# Patient Record
Sex: FEMALE | Race: WHITE | URBAN - METROPOLITAN AREA
[De-identification: names, ages, dates, MRNs, and addresses within clinical notes are randomized per-mention and may not be internally consistent; named-entity substitution may affect disease eponyms.]

---

## 2024-05-09 ENCOUNTER — OFFICE VISIT (OUTPATIENT)
Dept: PRIMARY CARE | Facility: CLINIC | Age: 64
End: 2024-05-09
Payer: COMMERCIAL

## 2024-05-09 VITALS
DIASTOLIC BLOOD PRESSURE: 95 MMHG | BODY MASS INDEX: 27.76 KG/M2 | RESPIRATION RATE: 18 BRPM | HEIGHT: 65 IN | HEART RATE: 99 BPM | SYSTOLIC BLOOD PRESSURE: 135 MMHG | OXYGEN SATURATION: 98 % | WEIGHT: 166.6 LBS

## 2024-05-09 DIAGNOSIS — I10 PRIMARY HYPERTENSION: Primary | ICD-10-CM

## 2024-05-09 DIAGNOSIS — E78.49 FAMILIAL HYPERLIPIDEMIA: ICD-10-CM

## 2024-05-09 DIAGNOSIS — F17.210 NICOTINE DEPENDENCE, CIGARETTES, UNCOMPLICATED: ICD-10-CM

## 2024-05-09 DIAGNOSIS — Z00.00 ROUTINE GENERAL MEDICAL EXAMINATION AT A HEALTH CARE FACILITY: ICD-10-CM

## 2024-05-09 DIAGNOSIS — Z13.6 SCREENING FOR CARDIOVASCULAR CONDITION: ICD-10-CM

## 2024-05-09 DIAGNOSIS — I73.9 CLAUDICATION (CMS-HCC): ICD-10-CM

## 2024-05-09 PROCEDURE — 99204 OFFICE O/P NEW MOD 45 MIN: CPT | Performed by: FAMILY MEDICINE

## 2024-05-09 PROCEDURE — 4004F PT TOBACCO SCREEN RCVD TLK: CPT | Performed by: FAMILY MEDICINE

## 2024-05-09 PROCEDURE — 3075F SYST BP GE 130 - 139MM HG: CPT | Performed by: FAMILY MEDICINE

## 2024-05-09 PROCEDURE — 3080F DIAST BP >= 90 MM HG: CPT | Performed by: FAMILY MEDICINE

## 2024-05-09 RX ORDER — ENALAPRIL MALEATE 5 MG/1
5 TABLET ORAL DAILY
COMMUNITY
End: 2024-05-22 | Stop reason: SDUPTHER

## 2024-05-09 NOTE — PROGRESS NOTES
"Subjective   Patient ID: Gertrudis Marie is a 64 y.o. female who presents for New Patient Visit.      Patient is here to establish care  Needs forms signed by provider for work  Blood pressure is controlled but has a blood pressure difference in both upper extremities 120/84 on the right side 135/90 on the left side    Continues to smoke has been trying to quit  Last cholesterol was high has not been rechecked in 2 years except 3 work  Current Outpatient Medications on File Prior to Visit   Medication Sig Dispense Refill    enalapril (Vasotec) 5 mg tablet Take 1 tablet (5 mg) by mouth once daily.      MULTIVITAMIN ORAL Take by mouth.       No current facility-administered medications on file prior to visit.        Review of Systems   Constitutional:  Negative for chills and fever.   HENT: Negative.     Respiratory: Negative.     Cardiovascular: Negative.    Gastrointestinal: Negative.  Negative for nausea and vomiting.   Endocrine: Negative.    Genitourinary: Negative.    Musculoskeletal: Negative.    Skin: Negative.  Negative for rash.   Allergic/Immunologic: Negative.    Neurological: Negative.    Hematological: Negative.    Psychiatric/Behavioral: Negative.     All other systems reviewed and are negative.      Objective   BP (!) 135/95 (BP Location: Left arm, Patient Position: Sitting, BP Cuff Size: Adult)   Pulse 99   Resp 18   Ht 1.651 m (5' 5\")   Wt 75.6 kg (166 lb 9.6 oz)   SpO2 98%   BMI 27.72 kg/m²   BSA: 1.86 meters squared  Growth percentiles: Facility age limit for growth %ezekiel is 20 years. Facility age limit for growth %ezekiel is 20 years.   No visits with results within 1 Week(s) from this visit.   Latest known visit with results is:   No results found for any previous visit.      Physical Exam  Constitutional:       General: She is not in acute distress.  Eyes:      Extraocular Movements: Extraocular movements intact.   Cardiovascular:      Rate and Rhythm: Normal rate and regular rhythm. "   Pulmonary:      Breath sounds: Normal breath sounds.   Abdominal:      General: Bowel sounds are normal.   Musculoskeletal:         General: Normal range of motion.      Cervical back: No rigidity.   Skin:     Findings: No rash.   Neurological:      General: No focal deficit present.      Mental Status: She is alert.   Psychiatric:         Thought Content: Thought content normal.         Assessment/Plan   Problem List Items Addressed This Visit             ICD-10-CM    Primary hypertension - Primary I10     Continue Vasotec monitor blood pressure at home  Discussed modifiable ( diet , exercise, weight ) and non modifiable ( Age, family history) risk factors HTN .  Encouraged to stay physically active , healthy eating habits - limit red meat intake, more of plant based diet, freshly prepared foods etc.,    Adverse effects of untreated hypertension discussed which include CAD/MI, CVA ,  CKD etc.,           Relevant Orders    Lipid Panel    CBC    TSH with reflex to Free T4 if abnormal    Hemoglobin A1C    Comprehensive Metabolic Panel    CT cardiac scoring wo IV contrast    Familial hyperlipidemia E78.49     Will check lipid panel patient was advised to follow low-fat diet and avoid red meat cheese increase exercise         Relevant Orders    CT cardiac scoring wo IV contrast    Nicotine dependence, cigarettes, uncomplicated F17.210    Relevant Orders    CT lung screening low dose    Screening for cardiovascular condition Z13.6    Relevant Orders    CT cardiac scoring wo IV contrast    Claudication (CMS-HCC) I73.9     Patient does not have claudication but blood pressure is different in both arms will check PVRs         Relevant Orders    Vascular US upper PVR

## 2024-05-11 PROBLEM — F17.210 NICOTINE DEPENDENCE, CIGARETTES, UNCOMPLICATED: Status: ACTIVE | Noted: 2024-05-11

## 2024-05-11 PROBLEM — Z13.6 SCREENING FOR CARDIOVASCULAR CONDITION: Status: ACTIVE | Noted: 2024-05-11

## 2024-05-11 PROBLEM — Z00.00 ROUTINE GENERAL MEDICAL EXAMINATION AT A HEALTH CARE FACILITY: Status: ACTIVE | Noted: 2024-05-11

## 2024-05-11 PROBLEM — E78.49 FAMILIAL HYPERLIPIDEMIA: Status: ACTIVE | Noted: 2024-05-11

## 2024-05-11 PROBLEM — I73.9 CLAUDICATION (CMS-HCC): Status: ACTIVE | Noted: 2024-05-11

## 2024-05-11 PROBLEM — I10 PRIMARY HYPERTENSION: Status: ACTIVE | Noted: 2024-05-11

## 2024-05-11 ASSESSMENT — ENCOUNTER SYMPTOMS
FEVER: 0
CARDIOVASCULAR NEGATIVE: 1
VOMITING: 0
CHILLS: 0
NAUSEA: 0
RESPIRATORY NEGATIVE: 1
ALLERGIC/IMMUNOLOGIC NEGATIVE: 1
GASTROINTESTINAL NEGATIVE: 1
PSYCHIATRIC NEGATIVE: 1
HEMATOLOGIC/LYMPHATIC NEGATIVE: 1
MUSCULOSKELETAL NEGATIVE: 1
NEUROLOGICAL NEGATIVE: 1
ENDOCRINE NEGATIVE: 1

## 2024-05-11 NOTE — ASSESSMENT & PLAN NOTE
Continue Vasotec monitor blood pressure at home  Discussed modifiable ( diet , exercise, weight ) and non modifiable ( Age, family history) risk factors HTN .  Encouraged to stay physically active , healthy eating habits - limit red meat intake, more of plant based diet, freshly prepared foods etc.,    Adverse effects of untreated hypertension discussed which include CAD/MI, CVA ,  CKD etc.,

## 2024-05-11 NOTE — ASSESSMENT & PLAN NOTE
Will check lipid panel patient was advised to follow low-fat diet and avoid red meat cheese increase exercise

## 2024-05-16 ENCOUNTER — ANCILLARY PROCEDURE (OUTPATIENT)
Dept: CARDIOLOGY | Facility: CLINIC | Age: 64
End: 2024-05-16
Payer: COMMERCIAL

## 2024-05-16 DIAGNOSIS — I73.9 CLAUDICATION (CMS-HCC): ICD-10-CM

## 2024-05-16 PROCEDURE — 93922 UPR/L XTREMITY ART 2 LEVELS: CPT | Performed by: INTERNAL MEDICINE

## 2024-05-16 PROCEDURE — 93922 UPR/L XTREMITY ART 2 LEVELS: CPT

## 2024-05-16 PROCEDURE — 93930 UPPER EXTREMITY STUDY: CPT | Performed by: INTERNAL MEDICINE

## 2024-05-22 DIAGNOSIS — I10 PRIMARY HYPERTENSION: ICD-10-CM

## 2024-05-22 RX ORDER — ENALAPRIL MALEATE 5 MG/1
5 TABLET ORAL DAILY
Qty: 90 TABLET | Refills: 1 | Status: SHIPPED | OUTPATIENT
Start: 2024-05-22

## 2024-05-24 ENCOUNTER — HOSPITAL ENCOUNTER (OUTPATIENT)
Dept: RADIOLOGY | Facility: CLINIC | Age: 64
Discharge: HOME | End: 2024-05-24
Payer: COMMERCIAL

## 2024-05-24 ENCOUNTER — LAB (OUTPATIENT)
Dept: LAB | Facility: LAB | Age: 64
End: 2024-05-24
Payer: COMMERCIAL

## 2024-05-24 DIAGNOSIS — Z00.00 ROUTINE GENERAL MEDICAL EXAMINATION AT A HEALTH CARE FACILITY: ICD-10-CM

## 2024-05-24 DIAGNOSIS — I10 PRIMARY HYPERTENSION: ICD-10-CM

## 2024-05-24 DIAGNOSIS — F17.210 NICOTINE DEPENDENCE, CIGARETTES, UNCOMPLICATED: ICD-10-CM

## 2024-05-24 LAB
ALBUMIN SERPL BCP-MCNC: 4.3 G/DL (ref 3.4–5)
ALP SERPL-CCNC: 82 U/L (ref 33–136)
ALT SERPL W P-5'-P-CCNC: 14 U/L (ref 7–45)
ANION GAP SERPL CALC-SCNC: 14 MMOL/L (ref 10–20)
AST SERPL W P-5'-P-CCNC: 13 U/L (ref 9–39)
BILIRUB SERPL-MCNC: 0.4 MG/DL (ref 0–1.2)
BUN SERPL-MCNC: 11 MG/DL (ref 6–23)
CALCIUM SERPL-MCNC: 9.8 MG/DL (ref 8.6–10.6)
CHLORIDE SERPL-SCNC: 104 MMOL/L (ref 98–107)
CHOLEST SERPL-MCNC: 235 MG/DL (ref 0–199)
CHOLESTEROL/HDL RATIO: 5.1
CO2 SERPL-SCNC: 29 MMOL/L (ref 21–32)
CREAT SERPL-MCNC: 0.83 MG/DL (ref 0.5–1.05)
EGFRCR SERPLBLD CKD-EPI 2021: 79 ML/MIN/1.73M*2
ERYTHROCYTE [DISTWIDTH] IN BLOOD BY AUTOMATED COUNT: 12.8 % (ref 11.5–14.5)
EST. AVERAGE GLUCOSE BLD GHB EST-MCNC: 105 MG/DL
GLUCOSE SERPL-MCNC: 84 MG/DL (ref 74–99)
HBA1C MFR BLD: 5.3 %
HCT VFR BLD AUTO: 41.8 % (ref 36–46)
HDLC SERPL-MCNC: 45.8 MG/DL
HGB BLD-MCNC: 13.8 G/DL (ref 12–16)
LDLC SERPL CALC-MCNC: 163 MG/DL
MCH RBC QN AUTO: 29.5 PG (ref 26–34)
MCHC RBC AUTO-ENTMCNC: 33 G/DL (ref 32–36)
MCV RBC AUTO: 89 FL (ref 80–100)
NON HDL CHOLESTEROL: 189 MG/DL (ref 0–149)
NRBC BLD-RTO: 0 /100 WBCS (ref 0–0)
PLATELET # BLD AUTO: 332 X10*3/UL (ref 150–450)
POTASSIUM SERPL-SCNC: 4.6 MMOL/L (ref 3.5–5.3)
PROT SERPL-MCNC: 6.9 G/DL (ref 6.4–8.2)
RBC # BLD AUTO: 4.68 X10*6/UL (ref 4–5.2)
SODIUM SERPL-SCNC: 142 MMOL/L (ref 136–145)
TRIGL SERPL-MCNC: 132 MG/DL (ref 0–149)
TSH SERPL-ACNC: 0.7 MIU/L (ref 0.44–3.98)
VLDL: 26 MG/DL (ref 0–40)
WBC # BLD AUTO: 7.5 X10*3/UL (ref 4.4–11.3)

## 2024-05-24 PROCEDURE — 71271 CT THORAX LUNG CANCER SCR C-: CPT

## 2024-05-24 PROCEDURE — 80053 COMPREHEN METABOLIC PANEL: CPT

## 2024-05-24 PROCEDURE — 84443 ASSAY THYROID STIM HORMONE: CPT

## 2024-05-24 PROCEDURE — 85027 COMPLETE CBC AUTOMATED: CPT

## 2024-05-24 PROCEDURE — 36415 COLL VENOUS BLD VENIPUNCTURE: CPT

## 2024-05-24 PROCEDURE — 83036 HEMOGLOBIN GLYCOSYLATED A1C: CPT

## 2024-05-24 PROCEDURE — 80061 LIPID PANEL: CPT

## 2024-05-27 DIAGNOSIS — E78.49 FAMILIAL HYPERLIPIDEMIA: Primary | ICD-10-CM

## 2024-05-27 RX ORDER — ATORVASTATIN CALCIUM 10 MG/1
10 TABLET, FILM COATED ORAL DAILY
Qty: 100 TABLET | Refills: 3 | Status: SHIPPED | OUTPATIENT
Start: 2024-05-27 | End: 2025-07-01

## 2024-08-12 ENCOUNTER — HOSPITAL ENCOUNTER (OUTPATIENT)
Dept: RADIOLOGY | Facility: CLINIC | Age: 64
Discharge: HOME | End: 2024-08-12
Payer: COMMERCIAL

## 2024-08-12 DIAGNOSIS — I10 PRIMARY HYPERTENSION: ICD-10-CM

## 2024-08-12 DIAGNOSIS — E78.49 FAMILIAL HYPERLIPIDEMIA: ICD-10-CM

## 2024-08-12 DIAGNOSIS — Z13.6 SCREENING FOR CARDIOVASCULAR CONDITION: ICD-10-CM

## 2024-08-12 PROCEDURE — 75571 CT HRT W/O DYE W/CA TEST: CPT

## 2024-08-29 ENCOUNTER — APPOINTMENT (OUTPATIENT)
Dept: PRIMARY CARE | Facility: CLINIC | Age: 64
End: 2024-08-29
Payer: COMMERCIAL

## 2024-08-29 DIAGNOSIS — E78.49 FAMILIAL HYPERLIPIDEMIA: ICD-10-CM

## 2024-08-29 DIAGNOSIS — Z00.00 ANNUAL PHYSICAL EXAM: Primary | ICD-10-CM

## 2024-08-29 DIAGNOSIS — I10 PRIMARY HYPERTENSION: ICD-10-CM

## 2024-08-29 PROCEDURE — 3079F DIAST BP 80-89 MM HG: CPT | Performed by: FAMILY MEDICINE

## 2024-08-29 PROCEDURE — 3075F SYST BP GE 130 - 139MM HG: CPT | Performed by: FAMILY MEDICINE

## 2024-08-29 PROCEDURE — 99396 PREV VISIT EST AGE 40-64: CPT | Performed by: FAMILY MEDICINE

## 2024-08-29 PROCEDURE — 3008F BODY MASS INDEX DOCD: CPT | Performed by: FAMILY MEDICINE

## 2024-08-29 RX ORDER — ENALAPRIL MALEATE 5 MG/1
5 TABLET ORAL DAILY
Qty: 90 TABLET | Refills: 1 | Status: SHIPPED | OUTPATIENT
Start: 2024-08-29

## 2024-08-29 ASSESSMENT — PATIENT HEALTH QUESTIONNAIRE - PHQ9
2. FEELING DOWN, DEPRESSED OR HOPELESS: NOT AT ALL
4. FEELING TIRED OR HAVING LITTLE ENERGY: NOT AT ALL
1. LITTLE INTEREST OR PLEASURE IN DOING THINGS: NOT AT ALL
3. TROUBLE FALLING OR STAYING ASLEEP OR SLEEPING TOO MUCH: NOT AT ALL
9. THOUGHTS THAT YOU WOULD BE BETTER OFF DEAD, OR OF HURTING YOURSELF: NOT AT ALL
10. IF YOU CHECKED OFF ANY PROBLEMS, HOW DIFFICULT HAVE THESE PROBLEMS MADE IT FOR YOU TO DO YOUR WORK, TAKE CARE OF THINGS AT HOME, OR GET ALONG WITH OTHER PEOPLE: NOT DIFFICULT AT ALL
6. FEELING BAD ABOUT YOURSELF - OR THAT YOU ARE A FAILURE OR HAVE LET YOURSELF OR YOUR FAMILY DOWN: NOT AT ALL
SUM OF ALL RESPONSES TO PHQ QUESTIONS 1-9: 0
5. POOR APPETITE OR OVEREATING: NOT AT ALL
8. MOVING OR SPEAKING SO SLOWLY THAT OTHER PEOPLE COULD HAVE NOTICED. OR THE OPPOSITE, BEING SO FIGETY OR RESTLESS THAT YOU HAVE BEEN MOVING AROUND A LOT MORE THAN USUAL: NOT AT ALL
SUM OF ALL RESPONSES TO PHQ9 QUESTIONS 1 AND 2: 0
7. TROUBLE CONCENTRATING ON THINGS, SUCH AS READING THE NEWSPAPER OR WATCHING TELEVISION: NOT AT ALL

## 2024-08-29 NOTE — PROGRESS NOTES
"  Subjective     Patient ID: Gertrudis Marie is a 64 y.o. female who presents for Annual Exam (CPE).      Last Physical : 1 Years ago     Pt's PMH, PSH, SH, FH , meds and allergies was obtained / reviewed and updated .     Dental visits : Y  Vision issues : N  Hearing issues : N    Immunizations : Y    Diet :  could be better  Exercise: not  Weight concerns : yes    Alcohol: as noted in SH  Tobacco: as noted in SH  Recreational drug use : None/ as noted in SH    Sexually active : Active   Contraception :   Menstrual problems:  Premenopausal/perimenopausal/ postmenopausal:post    G:  Parity:  Full term:    Premature:   (s):   Living :  Ab induced:   Ab spontaneous :  Ectopic :   Multiple :    PAP smear : gyn  Mammogram :utd  Colonoscopy:    Metabolic screening   - Lipid   - Glucose  ======================================================    Visit Vitals  /84   Pulse 109   Resp 15   Ht 1.651 m (5' 5\")   Wt 75.8 kg (167 lb 3.2 oz)   SpO2 95%   BMI 27.82 kg/m²   OB Status Postmenopausal   Smoking Status Some Days   BSA 1.86 m²      No LMP recorded. Patient is postmenopausal.     =====================================    Review of systems:  Constitutional: no chills, no fever and no night sweats.     Eyes: no blurred vision and no eyesight problems.     ENT: no hearing loss, no nasal congestion, no nasal discharge, no hoarseness and no sore throat.     Cardiovascular: no chest pain, no intermittent leg claudication, no lower extremity edema, no palpitations and no syncope.     Respiratory: no cough, no shortness of breath during exertion, no shortness of breath at rest and no wheezing.     Gastrointestinal: no abdominal pain, no blood in stools, no constipation, no diarrhea, no melena, no nausea, no rectal pain and no vomiting.     Genitourinary: no dysuria, no change in urinary frequency, no urinary hesitancy, no feelings of urinary urgency and no vaginal discharge.     Musculoskeletal: no arthralgias, no " back pain and no myalgias.     Integumentary: no new skin lesions and no rashes.     Neurological: no difficulty walking, no headache, no limb weakness, no numbness and no tingling.     Psychiatric: no anxiety, no depression, no anhedonia and no substance use disorders.     Endocrine: no recent weight gain and no recent weight loss.     Hematologic/Lymphatic: no tendency for easy bruising and no swollen glands.   ============================================================    Physical exam :    Constitutional: Alert and in no acute distress. Well developed, well nourished.     Eyes: Normal external exam. Pupils were equal in size, round, reactive to light (PERRL) with normal accommodation and extraocular movements intact (EOMI).     Ears, Nose, Mouth, and Throat: External inspection of ears and nose: Normal.  Otoscopic examination: Normal.      Neck: No neck mass was observed. Supple.     Cardiovascular: Heart rate and rhythm were normal, normal S1 and S2, no gallops, no murmurs and no pericardial rub    Pulmonary: No respiratory distress. Clear bilateral breath sounds.     Abdomen: Soft nontender; no abdominal mass palpated. No organomegaly.     Musculoskeletal: No joint swelling seen, normal movements of all extremities. Range of motion: Normal.  Muscle strength/tone: Normal.      Skin: Normal skin color and pigmentation, normal skin turgor, and no rash.     Neurologic: Deep tendon reflexes were 2+ and symmetric. Sensation: Normal.     Psychiatric: Judgment and insight: Intact. Mood and affect: Normal.    Lymphatic : Cervical/ axillary/ groin Lns Palpable/ non palpable            All other systems have been reviewed and are negative for complaint.      Assessment/Plan    Problem List Items Addressed This Visit             ICD-10-CM    Primary hypertension I10    Relevant Medications    enalapril (Vasotec) 5 mg tablet    Familial hyperlipidemia E78.49    Relevant Orders    Lipid Panel    Comprehensive Metabolic  Panel    Annual physical exam - Primary Z00.00    Relevant Orders    Comprehensive Metabolic Panel

## 2024-09-07 VITALS
WEIGHT: 167.2 LBS | BODY MASS INDEX: 27.86 KG/M2 | HEART RATE: 109 BPM | HEIGHT: 65 IN | OXYGEN SATURATION: 95 % | SYSTOLIC BLOOD PRESSURE: 134 MMHG | RESPIRATION RATE: 15 BRPM | DIASTOLIC BLOOD PRESSURE: 84 MMHG

## 2024-09-07 PROBLEM — Z00.00 ANNUAL PHYSICAL EXAM: Status: ACTIVE | Noted: 2024-09-07

## 2024-10-24 ENCOUNTER — OFFICE VISIT (OUTPATIENT)
Dept: PRIMARY CARE | Facility: CLINIC | Age: 64
End: 2024-10-24
Payer: COMMERCIAL

## 2024-10-24 VITALS — WEIGHT: 167 LBS | HEIGHT: 65 IN | BODY MASS INDEX: 27.82 KG/M2

## 2024-10-24 DIAGNOSIS — J01.00 ACUTE NON-RECURRENT MAXILLARY SINUSITIS: Primary | ICD-10-CM

## 2024-10-24 PROCEDURE — 3008F BODY MASS INDEX DOCD: CPT | Performed by: FAMILY MEDICINE

## 2024-10-24 PROCEDURE — 99213 OFFICE O/P EST LOW 20 MIN: CPT | Performed by: FAMILY MEDICINE

## 2024-10-24 RX ORDER — AZITHROMYCIN 250 MG/1
TABLET, FILM COATED ORAL
Qty: 6 TABLET | Refills: 0 | Status: SHIPPED | OUTPATIENT
Start: 2024-10-24 | End: 2024-10-29

## 2024-10-24 ASSESSMENT — ENCOUNTER SYMPTOMS
FEVER: 0
RHINORRHEA: 1
WEIGHT LOSS: 0
CHILLS: 0
DEPRESSION: 0
SWEATS: 0
MYALGIAS: 0
HEADACHES: 0
SORE THROAT: 0
OCCASIONAL FEELINGS OF UNSTEADINESS: 0
WHEEZING: 0
COUGH: 1
HEARTBURN: 0
SHORTNESS OF BREATH: 0
LOSS OF SENSATION IN FEET: 0
HEMOPTYSIS: 0

## 2024-10-24 ASSESSMENT — PATIENT HEALTH QUESTIONNAIRE - PHQ9
1. LITTLE INTEREST OR PLEASURE IN DOING THINGS: NOT AT ALL
SUM OF ALL RESPONSES TO PHQ9 QUESTIONS 1 AND 2: 0
2. FEELING DOWN, DEPRESSED OR HOPELESS: NOT AT ALL

## 2024-10-24 NOTE — PROGRESS NOTES
Answers submitted by the patient for this visit:  Cough Questionnaire (Submitted on 10/24/2024)  Chief Complaint: Cough  Chronicity: new  Onset: 1 to 4 weeks ago  Progression since onset: gradually improving  Frequency: hourly  Cough characteristics: productive of sputum  chest pain: No  chills: No  ear congestion: No  ear pain: No  fever: No  headaches: No  heartburn: No  hemoptysis: No  myalgias: No  nasal congestion: Yes  postnasal drip: Yes  rash: No  rhinorrhea: Yes  shortness of breath: No  sore throat: No  sweats: No  weight loss: No  wheezing: No  Aggravated by: nothing  Subjective   Patient ID: Gertrudis Marie is a 64 y.o. female who presents for Cough, sneezing, and Postcoital Mucus Check.      Cough  This is a new problem. The current episode started 1 to 4 weeks ago. The problem has been gradually improving. The problem occurs hourly. The cough is Productive of sputum. Associated symptoms include nasal congestion, postnasal drip and rhinorrhea. Pertinent negatives include no chest pain, chills, ear congestion, ear pain, fever, headaches, heartburn, hemoptysis, myalgias, rash, sore throat, shortness of breath, sweats, weight loss or wheezing. Nothing aggravates the symptoms. She has tried OTC cough suppressant for the symptoms. The treatment provided no relief.     Current Outpatient Medications on File Prior to Visit   Medication Sig Dispense Refill    atorvastatin (Lipitor) 10 mg tablet Take 1 tablet (10 mg) by mouth once daily. 100 tablet 3    enalapril (Vasotec) 5 mg tablet Take 1 tablet (5 mg) by mouth once daily. 90 tablet 1    MULTIVITAMIN ORAL Take by mouth.       No current facility-administered medications on file prior to visit.        Review of Systems   Constitutional:  Negative for chills, fever and weight loss.   HENT:  Positive for postnasal drip and rhinorrhea. Negative for ear pain and sore throat.    Respiratory:  Positive for cough. Negative for hemoptysis, shortness of breath and  "wheezing.    Cardiovascular:  Negative for chest pain.   Gastrointestinal:  Negative for heartburn.   Musculoskeletal:  Negative for myalgias.   Skin:  Negative for rash.   Neurological:  Negative for headaches.       Objective   Ht 1.651 m (5' 5\")   Wt 75.8 kg (167 lb)   BMI 27.79 kg/m²   BSA: 1.86 meters squared  Growth percentiles: Facility age limit for growth %ezekiel is 20 years. Facility age limit for growth %ezekiel is 20 years.   No visits with results within 1 Week(s) from this visit.   Latest known visit with results is:   Lab on 05/24/2024   Component Date Value Ref Range Status    Cholesterol 05/24/2024 235 (H)  0 - 199 mg/dL Final          Age      Desirable   Borderline High   High     0-19 Y     0 - 169       170 - 199     >/= 200    20-24 Y     0 - 189       190 - 224     >/= 225         >24 Y     0 - 199       200 - 239     >/= 240   **All ranges are based on fasting samples. Specific   therapeutic targets will vary based on patient-specific   cardiac risk.    Pediatric guidelines reference:Pediatrics 2011, 128(S5).Adult guidelines reference: NCEP ATPIII Guidelines,NANO 2001, 258:2486-97    Venipuncture immediately after or during the administration of Metamizole may lead to falsely low results. Testing should be performed immediately prior to Metamizole dosing.    HDL-Cholesterol 05/24/2024 45.8  mg/dL Final      Age       Very Low   Low     Normal    High    0-19 Y    < 35      < 40     40-45     ----  20-24 Y    ----     < 40      >45      ----        >24 Y      ----     < 40     40-60      >60      Cholesterol/HDL Ratio 05/24/2024 5.1   Final      Ref Values  Desirable  < 3.4  High Risk  > 5.0    LDL Calculated 05/24/2024 163 (H)  <=99 mg/dL Final                                Near   Borderline      AGE      Desirable  Optimal    High     High     Very High     0-19 Y     0 - 109     ---    110-129   >/= 130     ----    20-24 Y     0 - 119     ---    120-159   >/= 160     ----      >24 Y     0 -  " 99   100-129  130-159   160-189     >/=190      VLDL 05/24/2024 26  0 - 40 mg/dL Final    Triglycerides 05/24/2024 132  0 - 149 mg/dL Final       Age         Desirable   Borderline High   High     Very High   0 D-90 D    19 - 174         ----         ----        ----  91 D- 9 Y     0 -  74        75 -  99     >/= 100      ----    10-19 Y     0 -  89        90 - 129     >/= 130      ----    20-24 Y     0 - 114       115 - 149     >/= 150      ----         >24 Y     0 - 149       150 - 199    200- 499    >/= 500    Venipuncture immediately after or during the administration of Metamizole may lead to falsely low results. Testing should be performed immediately prior to Metamizole dosing.    Non HDL Cholesterol 05/24/2024 189 (H)  0 - 149 mg/dL Final          Age       Desirable   Borderline High   High     Very High     0-19 Y     0 - 119       120 - 144     >/= 145    >/= 160    20-24 Y     0 - 149       150 - 189     >/= 190      ----         >24 Y    30 mg/dL above LDL Cholesterol goal      WBC 05/24/2024 7.5  4.4 - 11.3 x10*3/uL Final    nRBC 05/24/2024 0.0  0.0 - 0.0 /100 WBCs Final    RBC 05/24/2024 4.68  4.00 - 5.20 x10*6/uL Final    Hemoglobin 05/24/2024 13.8  12.0 - 16.0 g/dL Final    Hematocrit 05/24/2024 41.8  36.0 - 46.0 % Final    MCV 05/24/2024 89  80 - 100 fL Final    MCH 05/24/2024 29.5  26.0 - 34.0 pg Final    MCHC 05/24/2024 33.0  32.0 - 36.0 g/dL Final    RDW 05/24/2024 12.8  11.5 - 14.5 % Final    Platelets 05/24/2024 332  150 - 450 x10*3/uL Final    Thyroid Stimulating Hormone 05/24/2024 0.70  0.44 - 3.98 mIU/L Final    Hemoglobin A1C 05/24/2024 5.3  see below % Final    Estimated Average Glucose 05/24/2024 105  Not Established mg/dL Final    Glucose 05/24/2024 84  74 - 99 mg/dL Final    Sodium 05/24/2024 142  136 - 145 mmol/L Final    Potassium 05/24/2024 4.6  3.5 - 5.3 mmol/L Final    Chloride 05/24/2024 104  98 - 107 mmol/L Final    Bicarbonate 05/24/2024 29  21 - 32 mmol/L Final    Anion Gap  05/24/2024 14  10 - 20 mmol/L Final    Urea Nitrogen 05/24/2024 11  6 - 23 mg/dL Final    Creatinine 05/24/2024 0.83  0.50 - 1.05 mg/dL Final    eGFR 05/24/2024 79  >60 mL/min/1.73m*2 Final    Calculations of estimated GFR are performed using the 2021 CKD-EPI Study Refit equation without the race variable for the IDMS-Traceable creatinine methods.  https://jasn.asnjournals.org/content/early/2021/09/22/ASN.8887636478    Calcium 05/24/2024 9.8  8.6 - 10.6 mg/dL Final    Albumin 05/24/2024 4.3  3.4 - 5.0 g/dL Final    Alkaline Phosphatase 05/24/2024 82  33 - 136 U/L Final    Total Protein 05/24/2024 6.9  6.4 - 8.2 g/dL Final    AST 05/24/2024 13  9 - 39 U/L Final    Bilirubin, Total 05/24/2024 0.4  0.0 - 1.2 mg/dL Final    ALT 05/24/2024 14  7 - 45 U/L Final    Patients treated with Sulfasalazine may generate falsely decreased results for ALT.      Physical Exam  Constitutional:       Appearance: Normal appearance. She is ill-appearing.   HENT:      Nose: Congestion and rhinorrhea present.   Cardiovascular:      Rate and Rhythm: Normal rate and regular rhythm.   Pulmonary:      Effort: Pulmonary effort is normal.      Breath sounds: Normal breath sounds.   Abdominal:      General: Bowel sounds are normal.   Neurological:      General: No focal deficit present.      Mental Status: She is alert.   Psychiatric:         Mood and Affect: Mood normal.         Assessment/Plan   Problem List Items Addressed This Visit             ICD-10-CM    Acute non-recurrent maxillary sinusitis - Primary J01.00     You have a sinus infection.  Take the antibiotic you were prescribed until it is gone.  Take Mucinex DM or Robitussin DM as needed for cough and congestion.  You can also try Flonase Allergy 2 sprays to each nostril once a day for congestion.  You can also try salt water nose spray for stuffy nose.  If you do not have high blood pressure or heart problems, you can try Sudafed or similar decongestants for congestion.  You can  take Claritin or Zyrtec as needed for drainage.  Take over-the-counter acetaminophen or ibuprofen as needed for pain or fever.  You can try throat lozenges or salt water gargles for any sore throat.  Your symptoms may take 7-10 days to go away.  Drink lots of fluids. Get plenty of rest.  Contact us if you have worsening symptoms such as high fever (102F), shortness of breath, inability to keep liquids and solids down, or other worsening symptoms. Contact us if your symptoms fail to improve after 10 days.  If your symptoms get worse after the clinic is closed, you can call our office to reach the on-call doctor.          Relevant Medications    azithromycin (Zithromax) 250 mg tablet             Comprehensive Medical/Surgical/Social/Family History  No past medical history on file.  No past surgical history on file.  Social History     Social History Narrative    Not on file       Allergies and Medications  Doxycycline, Amoxicillin-pot clavulanate, and Sulfamethoxazole  Current Outpatient Medications   Medication Instructions    atorvastatin (LIPITOR) 10 mg, oral, Daily    azithromycin (Zithromax) 250 mg tablet Take 2 tablets (500 mg) by mouth once daily for 1 day, THEN 1 tablet (250 mg) once daily for 4 days. Take 2 tabs (500 mg) by mouth today, than 1 daily for 4 days..    enalapril (VASOTEC) 5 mg, oral, Daily    MULTIVITAMIN ORAL oral

## 2024-10-27 PROBLEM — J01.00 ACUTE NON-RECURRENT MAXILLARY SINUSITIS: Status: ACTIVE | Noted: 2024-10-27

## 2024-10-27 ASSESSMENT — ENCOUNTER SYMPTOMS
SWEATS: 0
MYALGIAS: 0
COUGH: 1
WEIGHT LOSS: 0
SHORTNESS OF BREATH: 0
HEADACHES: 0
WHEEZING: 0
HEARTBURN: 0
SORE THROAT: 0
CHILLS: 0
RHINORRHEA: 1
HEMOPTYSIS: 0
FEVER: 0

## 2024-11-06 DIAGNOSIS — J01.00 ACUTE NON-RECURRENT MAXILLARY SINUSITIS: Primary | ICD-10-CM

## 2024-11-06 RX ORDER — AZITHROMYCIN 250 MG/1
TABLET, FILM COATED ORAL
Qty: 6 TABLET | Refills: 0 | Status: SHIPPED | OUTPATIENT
Start: 2024-11-06 | End: 2024-11-11

## 2024-12-01 PROBLEM — F17.200 TOBACCO USE DISORDER: Status: ACTIVE | Noted: 2023-07-26

## 2024-12-01 PROBLEM — E21.0 PRIMARY HYPERPARATHYROIDISM (MULTI): Status: ACTIVE | Noted: 2017-06-14

## 2024-12-01 PROBLEM — N32.81 OVERACTIVE BLADDER: Status: ACTIVE | Noted: 2023-07-26

## 2024-12-01 PROBLEM — R06.83 LOUD SNORING: Status: ACTIVE | Noted: 2023-07-26

## 2024-12-01 PROBLEM — L43.8 ORAL LICHEN PLANUS: Status: ACTIVE | Noted: 2021-07-15

## 2024-12-01 PROBLEM — G44.89 OTHER HEADACHE SYNDROME: Status: RESOLVED | Noted: 2020-12-14 | Resolved: 2024-12-01

## 2024-12-01 PROBLEM — E04.1: Status: ACTIVE | Noted: 2017-06-14

## 2024-12-01 RX ORDER — TROSPIUM CHLORIDE 20 MG/1
1 TABLET, FILM COATED ORAL
COMMUNITY
Start: 2024-01-27

## 2024-12-05 ENCOUNTER — OFFICE VISIT (OUTPATIENT)
Dept: PRIMARY CARE | Facility: CLINIC | Age: 64
End: 2024-12-05
Payer: COMMERCIAL

## 2024-12-05 VITALS
WEIGHT: 170 LBS | HEART RATE: 105 BPM | BODY MASS INDEX: 28.29 KG/M2 | OXYGEN SATURATION: 96 % | DIASTOLIC BLOOD PRESSURE: 89 MMHG | SYSTOLIC BLOOD PRESSURE: 131 MMHG

## 2024-12-05 DIAGNOSIS — I88.9 LYMPHADENITIS: Primary | ICD-10-CM

## 2024-12-05 PROCEDURE — 3075F SYST BP GE 130 - 139MM HG: CPT | Performed by: FAMILY MEDICINE

## 2024-12-05 PROCEDURE — 99213 OFFICE O/P EST LOW 20 MIN: CPT | Performed by: FAMILY MEDICINE

## 2024-12-05 PROCEDURE — 3079F DIAST BP 80-89 MM HG: CPT | Performed by: FAMILY MEDICINE

## 2024-12-05 RX ORDER — CEPHALEXIN 500 MG/1
500 CAPSULE ORAL 3 TIMES DAILY
Qty: 21 CAPSULE | Refills: 0 | Status: SHIPPED | OUTPATIENT
Start: 2024-12-05 | End: 2024-12-12

## 2024-12-05 ASSESSMENT — ENCOUNTER SYMPTOMS
LOSS OF SENSATION IN FEET: 0
DEPRESSION: 0
OCCASIONAL FEELINGS OF UNSTEADINESS: 0

## 2024-12-05 ASSESSMENT — PATIENT HEALTH QUESTIONNAIRE - PHQ9
1. LITTLE INTEREST OR PLEASURE IN DOING THINGS: NOT AT ALL
2. FEELING DOWN, DEPRESSED OR HOPELESS: NOT AT ALL
SUM OF ALL RESPONSES TO PHQ9 QUESTIONS 1 AND 2: 0

## 2024-12-05 NOTE — PROGRESS NOTES
Subjective   Patient ID: Gertrudis Marie is a 64 y.o. female who presents for Follow-up (Bump under arm ).      HPI painful lump under the left underarm off-and-on for the last couple weeks hurts to touch was better and then reappeared yesterday is up-to-date with mammogram no breast changes no breast pain no breast masses no fever no chills no nipple drainage has only 1 month  Current Outpatient Medications on File Prior to Visit   Medication Sig Dispense Refill    atorvastatin (Lipitor) 10 mg tablet Take 1 tablet (10 mg) by mouth once daily. 100 tablet 3    enalapril (Vasotec) 5 mg tablet Take 1 tablet (5 mg) by mouth once daily. 90 tablet 1    MULTIVITAMIN ORAL Take by mouth.      trospium (Sanctura) 20 mg tablet Take 1 tablet (20 mg) by mouth every 12 hours.       No current facility-administered medications on file prior to visit.        Review of Systems   Constitutional:  Negative for chills and fever.   HENT: Negative.     Respiratory: Negative.     Cardiovascular: Negative.    Gastrointestinal: Negative.  Negative for nausea and vomiting.   Endocrine: Negative.    Genitourinary: Negative.    Musculoskeletal: Negative.    Skin: Negative.  Negative for rash.   Allergic/Immunologic: Negative.    Neurological: Negative.    Hematological: Negative.    Psychiatric/Behavioral: Negative.     All other systems reviewed and are negative.      Objective   /89   Pulse 105   Wt 77.1 kg (170 lb)   SpO2 96%   BMI 28.29 kg/m²   BSA: 1.88 meters squared  Growth percentiles: Facility age limit for growth %ezekiel is 20 years. Facility age limit for growth %ezekiel is 20 years.   No visits with results within 1 Week(s) from this visit.   Latest known visit with results is:   Lab on 05/24/2024   Component Date Value Ref Range Status    Cholesterol 05/24/2024 235 (H)  0 - 199 mg/dL Final          Age      Desirable   Borderline High   High     0-19 Y     0 - 169       170 - 199     >/= 200    20-24 Y     0 - 189       190  - 224     >/= 225         >24 Y     0 - 199       200 - 239     >/= 240   **All ranges are based on fasting samples. Specific   therapeutic targets will vary based on patient-specific   cardiac risk.    Pediatric guidelines reference:Pediatrics 2011, 128(S5).Adult guidelines reference: NCEP ATPIII Guidelines,NANO 2001, 258:5316-97    Venipuncture immediately after or during the administration of Metamizole may lead to falsely low results. Testing should be performed immediately prior to Metamizole dosing.    HDL-Cholesterol 05/24/2024 45.8  mg/dL Final      Age       Very Low   Low     Normal    High    0-19 Y    < 35      < 40     40-45     ----  20-24 Y    ----     < 40      >45      ----        >24 Y      ----     < 40     40-60      >60      Cholesterol/HDL Ratio 05/24/2024 5.1   Final      Ref Values  Desirable  < 3.4  High Risk  > 5.0    LDL Calculated 05/24/2024 163 (H)  <=99 mg/dL Final                                Near   Borderline      AGE      Desirable  Optimal    High     High     Very High     0-19 Y     0 - 109     ---    110-129   >/= 130     ----    20-24 Y     0 - 119     ---    120-159   >/= 160     ----      >24 Y     0 -  99   100-129  130-159   160-189     >/=190      VLDL 05/24/2024 26  0 - 40 mg/dL Final    Triglycerides 05/24/2024 132  0 - 149 mg/dL Final       Age         Desirable   Borderline High   High     Very High   0 D-90 D    19 - 174         ----         ----        ----  91 D- 9 Y     0 -  74        75 -  99     >/= 100      ----    10-19 Y     0 -  89        90 - 129     >/= 130      ----    20-24 Y     0 - 114       115 - 149     >/= 150      ----         >24 Y     0 - 149       150 - 199    200- 499    >/= 500    Venipuncture immediately after or during the administration of Metamizole may lead to falsely low results. Testing should be performed immediately prior to Metamizole dosing.    Non HDL Cholesterol 05/24/2024 189 (H)  0 - 149 mg/dL Final          Age       Desirable    Borderline High   High     Very High     0-19 Y     0 - 119       120 - 144     >/= 145    >/= 160    20-24 Y     0 - 149       150 - 189     >/= 190      ----         >24 Y    30 mg/dL above LDL Cholesterol goal      WBC 05/24/2024 7.5  4.4 - 11.3 x10*3/uL Final    nRBC 05/24/2024 0.0  0.0 - 0.0 /100 WBCs Final    RBC 05/24/2024 4.68  4.00 - 5.20 x10*6/uL Final    Hemoglobin 05/24/2024 13.8  12.0 - 16.0 g/dL Final    Hematocrit 05/24/2024 41.8  36.0 - 46.0 % Final    MCV 05/24/2024 89  80 - 100 fL Final    MCH 05/24/2024 29.5  26.0 - 34.0 pg Final    MCHC 05/24/2024 33.0  32.0 - 36.0 g/dL Final    RDW 05/24/2024 12.8  11.5 - 14.5 % Final    Platelets 05/24/2024 332  150 - 450 x10*3/uL Final    Thyroid Stimulating Hormone 05/24/2024 0.70  0.44 - 3.98 mIU/L Final    Hemoglobin A1C 05/24/2024 5.3  see below % Final    Estimated Average Glucose 05/24/2024 105  Not Established mg/dL Final    Glucose 05/24/2024 84  74 - 99 mg/dL Final    Sodium 05/24/2024 142  136 - 145 mmol/L Final    Potassium 05/24/2024 4.6  3.5 - 5.3 mmol/L Final    Chloride 05/24/2024 104  98 - 107 mmol/L Final    Bicarbonate 05/24/2024 29  21 - 32 mmol/L Final    Anion Gap 05/24/2024 14  10 - 20 mmol/L Final    Urea Nitrogen 05/24/2024 11  6 - 23 mg/dL Final    Creatinine 05/24/2024 0.83  0.50 - 1.05 mg/dL Final    eGFR 05/24/2024 79  >60 mL/min/1.73m*2 Final    Calculations of estimated GFR are performed using the 2021 CKD-EPI Study Refit equation without the race variable for the IDMS-Traceable creatinine methods.  https://jasn.asnjournals.org/content/early/2021/09/22/ASN.1994704252    Calcium 05/24/2024 9.8  8.6 - 10.6 mg/dL Final    Albumin 05/24/2024 4.3  3.4 - 5.0 g/dL Final    Alkaline Phosphatase 05/24/2024 82  33 - 136 U/L Final    Total Protein 05/24/2024 6.9  6.4 - 8.2 g/dL Final    AST 05/24/2024 13  9 - 39 U/L Final    Bilirubin, Total 05/24/2024 0.4  0.0 - 1.2 mg/dL Final    ALT 05/24/2024 14  7 - 45 U/L Final    Patients treated  with Sulfasalazine may generate falsely decreased results for ALT.      Physical Exam  Constitutional:       Appearance: Normal appearance.   Cardiovascular:      Rate and Rhythm: Normal rate and regular rhythm.   Pulmonary:      Effort: Pulmonary effort is normal.      Breath sounds: Normal breath sounds.   Abdominal:      General: Bowel sounds are normal.   Neurological:      General: No focal deficit present.      Mental Status: She is alert.   Psychiatric:         Mood and Affect: Mood normal.       Left axilla 3 cm firm tender oval mass no redness  Assessment/Plan   Problem List Items Addressed This Visit       Lymphadenitis - Primary     Patient was started on antibiotic will get ultrasound if lesion persists         Relevant Medications    cephalexin (Keflex) 500 mg capsule

## 2024-12-07 PROBLEM — I88.9 LYMPHADENITIS: Status: ACTIVE | Noted: 2024-12-07

## 2024-12-07 ASSESSMENT — ENCOUNTER SYMPTOMS
NEUROLOGICAL NEGATIVE: 1
GASTROINTESTINAL NEGATIVE: 1
FEVER: 0
CARDIOVASCULAR NEGATIVE: 1
PSYCHIATRIC NEGATIVE: 1
HEMATOLOGIC/LYMPHATIC NEGATIVE: 1
VOMITING: 0
MUSCULOSKELETAL NEGATIVE: 1
RESPIRATORY NEGATIVE: 1
ALLERGIC/IMMUNOLOGIC NEGATIVE: 1
CHILLS: 0
NAUSEA: 0
ENDOCRINE NEGATIVE: 1

## 2025-01-21 ENCOUNTER — LAB (OUTPATIENT)
Dept: LAB | Facility: LAB | Age: 65
End: 2025-01-21
Payer: COMMERCIAL

## 2025-01-21 ENCOUNTER — OFFICE VISIT (OUTPATIENT)
Dept: PRIMARY CARE | Facility: CLINIC | Age: 65
End: 2025-01-21
Payer: COMMERCIAL

## 2025-01-21 DIAGNOSIS — Z00.00 ANNUAL PHYSICAL EXAM: ICD-10-CM

## 2025-01-21 DIAGNOSIS — Z12.31 VISIT FOR SCREENING MAMMOGRAM: ICD-10-CM

## 2025-01-21 DIAGNOSIS — R10.12 LUQ PAIN: Primary | ICD-10-CM

## 2025-01-21 DIAGNOSIS — I10 PRIMARY HYPERTENSION: ICD-10-CM

## 2025-01-21 DIAGNOSIS — R10.12 LUQ PAIN: ICD-10-CM

## 2025-01-21 PROCEDURE — 3075F SYST BP GE 130 - 139MM HG: CPT | Performed by: FAMILY MEDICINE

## 2025-01-21 PROCEDURE — 80048 BASIC METABOLIC PNL TOTAL CA: CPT

## 2025-01-21 PROCEDURE — 3008F BODY MASS INDEX DOCD: CPT | Performed by: FAMILY MEDICINE

## 2025-01-21 PROCEDURE — 85027 COMPLETE CBC AUTOMATED: CPT

## 2025-01-21 PROCEDURE — 3079F DIAST BP 80-89 MM HG: CPT | Performed by: FAMILY MEDICINE

## 2025-01-21 PROCEDURE — 99214 OFFICE O/P EST MOD 30 MIN: CPT | Performed by: FAMILY MEDICINE

## 2025-01-21 RX ORDER — PREDNISONE 20 MG/1
20 TABLET ORAL 2 TIMES DAILY
Qty: 14 TABLET | Refills: 0 | Status: SHIPPED | OUTPATIENT
Start: 2025-01-21 | End: 2025-01-28

## 2025-01-21 ASSESSMENT — PATIENT HEALTH QUESTIONNAIRE - PHQ9
SUM OF ALL RESPONSES TO PHQ9 QUESTIONS 1 AND 2: 0
SUM OF ALL RESPONSES TO PHQ9 QUESTIONS 1 AND 2: 0
1. LITTLE INTEREST OR PLEASURE IN DOING THINGS: NOT AT ALL
1. LITTLE INTEREST OR PLEASURE IN DOING THINGS: NOT AT ALL
2. FEELING DOWN, DEPRESSED OR HOPELESS: NOT AT ALL
2. FEELING DOWN, DEPRESSED OR HOPELESS: NOT AT ALL

## 2025-01-21 ASSESSMENT — ENCOUNTER SYMPTOMS
LOSS OF SENSATION IN FEET: 0
OCCASIONAL FEELINGS OF UNSTEADINESS: 0
DEPRESSION: 0

## 2025-01-22 LAB
ANION GAP SERPL CALC-SCNC: 14 MMOL/L (ref 10–20)
BUN SERPL-MCNC: 14 MG/DL (ref 6–23)
CALCIUM SERPL-MCNC: 10.4 MG/DL (ref 8.6–10.6)
CHLORIDE SERPL-SCNC: 102 MMOL/L (ref 98–107)
CO2 SERPL-SCNC: 29 MMOL/L (ref 21–32)
CREAT SERPL-MCNC: 0.74 MG/DL (ref 0.5–1.05)
EGFRCR SERPLBLD CKD-EPI 2021: 90 ML/MIN/1.73M*2
ERYTHROCYTE [DISTWIDTH] IN BLOOD BY AUTOMATED COUNT: 12.1 % (ref 11.5–14.5)
GLUCOSE SERPL-MCNC: 85 MG/DL (ref 74–99)
HCT VFR BLD AUTO: 42.3 % (ref 36–46)
HGB BLD-MCNC: 14.1 G/DL (ref 12–16)
MCH RBC QN AUTO: 29.3 PG (ref 26–34)
MCHC RBC AUTO-ENTMCNC: 33.3 G/DL (ref 32–36)
MCV RBC AUTO: 88 FL (ref 80–100)
NRBC BLD-RTO: 0 /100 WBCS (ref 0–0)
PLATELET # BLD AUTO: 287 X10*3/UL (ref 150–450)
POTASSIUM SERPL-SCNC: 4.5 MMOL/L (ref 3.5–5.3)
RBC # BLD AUTO: 4.82 X10*6/UL (ref 4–5.2)
SODIUM SERPL-SCNC: 140 MMOL/L (ref 136–145)
WBC # BLD AUTO: 6.5 X10*3/UL (ref 4.4–11.3)

## 2025-01-26 VITALS
WEIGHT: 166.4 LBS | HEART RATE: 96 BPM | HEIGHT: 65 IN | BODY MASS INDEX: 27.72 KG/M2 | DIASTOLIC BLOOD PRESSURE: 84 MMHG | SYSTOLIC BLOOD PRESSURE: 134 MMHG

## 2025-01-26 PROBLEM — R10.12 LUQ PAIN: Status: ACTIVE | Noted: 2025-01-26

## 2025-01-26 PROBLEM — Z12.31 VISIT FOR SCREENING MAMMOGRAM: Status: ACTIVE | Noted: 2025-01-26

## 2025-01-26 ASSESSMENT — ENCOUNTER SYMPTOMS
PSYCHIATRIC NEGATIVE: 1
FEVER: 0
ABDOMINAL PAIN: 1
ALLERGIC/IMMUNOLOGIC NEGATIVE: 1
NAUSEA: 0
HEMATOLOGIC/LYMPHATIC NEGATIVE: 1
VOMITING: 0
ENDOCRINE NEGATIVE: 1
MUSCULOSKELETAL NEGATIVE: 1
RESPIRATORY NEGATIVE: 1
CHILLS: 0
NEUROLOGICAL NEGATIVE: 1
CARDIOVASCULAR NEGATIVE: 1

## 2025-01-26 NOTE — PROGRESS NOTES
"Subjective   Patient ID: Gertrudis Marie is a 65 y.o. female who presents for Follow-up (Having pain on left side of body).      HPI  Has had abdominal pain moderate to severe intensity ongoing for 3 weeks not relived by tylenol Unsure about aggrevating factir  Patient is unclear what aggravates the pain feels pain throughout the day denies being constipated appetites been poor unsure about blood in stool area is tender to touch  Has had pain in the left axilla for 2 months was treated with antibiotic but continues to have pain pain deep in the breast tissue under the left axillary fold and in the tail of the left breast denies shoulder pain  Blood pressure controlled  Current Outpatient Medications on File Prior to Visit   Medication Sig Dispense Refill    atorvastatin (Lipitor) 10 mg tablet Take 1 tablet (10 mg) by mouth once daily. 100 tablet 3    enalapril (Vasotec) 5 mg tablet Take 1 tablet (5 mg) by mouth once daily. 90 tablet 1    MULTIVITAMIN ORAL Take by mouth.      trospium (Sanctura) 20 mg tablet Take 1 tablet (20 mg) by mouth every 12 hours.       No current facility-administered medications on file prior to visit.        Review of Systems   Constitutional:  Negative for chills and fever.   HENT: Negative.     Respiratory: Negative.     Cardiovascular: Negative.    Gastrointestinal:  Positive for abdominal pain. Negative for nausea and vomiting.   Endocrine: Negative.    Genitourinary: Negative.    Musculoskeletal: Negative.    Skin: Negative.  Negative for rash.   Allergic/Immunologic: Negative.    Neurological: Negative.    Hematological: Negative.    Psychiatric/Behavioral: Negative.     All other systems reviewed and are negative.      Objective   /84   Pulse 96   Ht 1.651 m (5' 5\")   Wt 75.5 kg (166 lb 6.4 oz)   BMI 27.69 kg/m²   BSA: 1.86 meters squared  Growth percentiles: Facility age limit for growth %ezekiel is 20 years. Facility age limit for growth %ezekiel is 20 years.   Lab on " 01/21/2025   Component Date Value Ref Range Status    Glucose 01/21/2025 85  74 - 99 mg/dL Final    Sodium 01/21/2025 140  136 - 145 mmol/L Final    Potassium 01/21/2025 4.5  3.5 - 5.3 mmol/L Final    Chloride 01/21/2025 102  98 - 107 mmol/L Final    Bicarbonate 01/21/2025 29  21 - 32 mmol/L Final    Anion Gap 01/21/2025 14  10 - 20 mmol/L Final    Urea Nitrogen 01/21/2025 14  6 - 23 mg/dL Final    Creatinine 01/21/2025 0.74  0.50 - 1.05 mg/dL Final    eGFR 01/21/2025 90  >60 mL/min/1.73m*2 Final    Calculations of estimated GFR are performed using the 2021 CKD-EPI Study Refit equation without the race variable for the IDMS-Traceable creatinine methods.  https://jasn.asnjournals.org/content/early/2021/09/22/ASN.4636811337    Calcium 01/21/2025 10.4  8.6 - 10.6 mg/dL Final    WBC 01/21/2025 6.5  4.4 - 11.3 x10*3/uL Final    nRBC 01/21/2025 0.0  0.0 - 0.0 /100 WBCs Final    RBC 01/21/2025 4.82  4.00 - 5.20 x10*6/uL Final    Hemoglobin 01/21/2025 14.1  12.0 - 16.0 g/dL Final    Hematocrit 01/21/2025 42.3  36.0 - 46.0 % Final    MCV 01/21/2025 88  80 - 100 fL Final    MCH 01/21/2025 29.3  26.0 - 34.0 pg Final    MCHC 01/21/2025 33.3  32.0 - 36.0 g/dL Final    RDW 01/21/2025 12.1  11.5 - 14.5 % Final    Platelets 01/21/2025 287  150 - 450 x10*3/uL Final      Physical Exam  Constitutional:       General: She is not in acute distress.  Eyes:      Extraocular Movements: Extraocular movements intact.   Cardiovascular:      Rate and Rhythm: Normal rate and regular rhythm.   Pulmonary:      Breath sounds: Normal breath sounds.   Abdominal:      Comments: Tenderness over the left lower quadrant with rebound and guarding mild swelling present   Musculoskeletal:         General: Normal range of motion.      Cervical back: No rigidity.   Skin:     Findings: No rash.   Neurological:      General: No focal deficit present.      Mental Status: She is alert.   Psychiatric:         Thought Content: Thought content normal.          Assessment/Plan   Problem List Items Addressed This Visit       Primary hypertension     Continue Vasotec monitor blood pressure at home  Discussed modifiable ( diet , exercise, weight ) and non modifiable ( Age, family history) risk factors HTN .  Encouraged to stay physically active , healthy eating habits - limit red meat intake, more of plant based diet, freshly prepared foods etc.,    Adverse effects of untreated hypertension discussed which include CAD/MI, CVA ,  CKD etc.,           Annual physical exam    Relevant Orders    CBC (Completed)    Visit for screening mammogram    Relevant Orders    BI US breast complete left    BI mammo bilateral screening tomosynthesis    LUQ pain - Primary    Relevant Medications    predniSONE (Deltasone) 20 mg tablet    Other Relevant Orders    CT abdomen pelvis w IV contrast    Basic metabolic panel (Completed)

## 2025-01-29 ENCOUNTER — HOSPITAL ENCOUNTER (OUTPATIENT)
Dept: RADIOLOGY | Facility: EXTERNAL LOCATION | Age: 65
Discharge: HOME | End: 2025-01-29

## 2025-01-29 ENCOUNTER — HOSPITAL ENCOUNTER (OUTPATIENT)
Dept: RADIOLOGY | Facility: CLINIC | Age: 65
Discharge: HOME | End: 2025-01-29
Payer: COMMERCIAL

## 2025-01-29 DIAGNOSIS — Z12.31 VISIT FOR SCREENING MAMMOGRAM: ICD-10-CM

## 2025-01-29 PROCEDURE — 76642 ULTRASOUND BREAST LIMITED: CPT | Mod: LT

## 2025-02-04 ENCOUNTER — HOSPITAL ENCOUNTER (OUTPATIENT)
Dept: RADIOLOGY | Facility: CLINIC | Age: 65
Discharge: HOME | End: 2025-02-04
Payer: COMMERCIAL

## 2025-02-04 DIAGNOSIS — R10.12 LUQ PAIN: ICD-10-CM

## 2025-02-04 DIAGNOSIS — Z12.31 VISIT FOR SCREENING MAMMOGRAM: ICD-10-CM

## 2025-02-04 PROCEDURE — 77067 SCR MAMMO BI INCL CAD: CPT | Performed by: RADIOLOGY

## 2025-02-04 PROCEDURE — 77063 BREAST TOMOSYNTHESIS BI: CPT

## 2025-02-04 PROCEDURE — 2550000001 HC RX 255 CONTRASTS: Performed by: FAMILY MEDICINE

## 2025-02-04 PROCEDURE — 74177 CT ABD & PELVIS W/CONTRAST: CPT | Performed by: RADIOLOGY

## 2025-02-04 PROCEDURE — 77063 BREAST TOMOSYNTHESIS BI: CPT | Performed by: RADIOLOGY

## 2025-02-04 PROCEDURE — 74177 CT ABD & PELVIS W/CONTRAST: CPT

## 2025-02-04 RX ADMIN — IOHEXOL 68 ML: 350 INJECTION, SOLUTION INTRAVENOUS at 09:56

## 2025-02-27 ENCOUNTER — APPOINTMENT (OUTPATIENT)
Dept: PRIMARY CARE | Facility: CLINIC | Age: 65
End: 2025-02-27
Payer: COMMERCIAL

## 2025-02-27 VITALS
DIASTOLIC BLOOD PRESSURE: 80 MMHG | SYSTOLIC BLOOD PRESSURE: 125 MMHG | WEIGHT: 168 LBS | HEIGHT: 65 IN | HEART RATE: 94 BPM | BODY MASS INDEX: 27.99 KG/M2

## 2025-02-27 DIAGNOSIS — Z13.29 SCREENING FOR THYROID DISORDER: ICD-10-CM

## 2025-02-27 DIAGNOSIS — Z00.00 ROUTINE GENERAL MEDICAL EXAMINATION AT A HEALTH CARE FACILITY: ICD-10-CM

## 2025-02-27 DIAGNOSIS — I10 PRIMARY HYPERTENSION: Primary | ICD-10-CM

## 2025-02-27 DIAGNOSIS — Z13.220 SCREENING FOR LIPID DISORDERS: ICD-10-CM

## 2025-02-27 DIAGNOSIS — E78.49 FAMILIAL HYPERLIPIDEMIA: ICD-10-CM

## 2025-02-27 DIAGNOSIS — Z13.1 SCREENING FOR DIABETES MELLITUS: ICD-10-CM

## 2025-02-27 DIAGNOSIS — Z87.891 FORMER SMOKER: ICD-10-CM

## 2025-02-27 RX ORDER — ENALAPRIL MALEATE 5 MG/1
5 TABLET ORAL DAILY
Qty: 90 TABLET | Refills: 1 | Status: SHIPPED | OUTPATIENT
Start: 2025-02-27

## 2025-02-27 RX ORDER — ATORVASTATIN CALCIUM 10 MG/1
10 TABLET, FILM COATED ORAL DAILY
Qty: 100 TABLET | Refills: 3 | Status: SHIPPED | OUTPATIENT
Start: 2025-02-27 | End: 2026-04-03

## 2025-02-27 ASSESSMENT — ENCOUNTER SYMPTOMS
LOSS OF SENSATION IN FEET: 0
OCCASIONAL FEELINGS OF UNSTEADINESS: 0
DEPRESSION: 0

## 2025-02-27 ASSESSMENT — PATIENT HEALTH QUESTIONNAIRE - PHQ9
2. FEELING DOWN, DEPRESSED OR HOPELESS: NOT AT ALL
SUM OF ALL RESPONSES TO PHQ9 QUESTIONS 1 AND 2: 0
1. LITTLE INTEREST OR PLEASURE IN DOING THINGS: NOT AT ALL

## 2025-03-01 PROBLEM — Z00.00 ROUTINE GENERAL MEDICAL EXAMINATION AT A HEALTH CARE FACILITY: Status: ACTIVE | Noted: 2025-03-01

## 2025-03-01 PROBLEM — Z13.29 SCREENING FOR THYROID DISORDER: Status: ACTIVE | Noted: 2025-03-01

## 2025-03-01 PROBLEM — Z13.1 SCREENING FOR DIABETES MELLITUS: Status: ACTIVE | Noted: 2025-03-01

## 2025-03-01 PROBLEM — Z13.220 SCREENING FOR LIPID DISORDERS: Status: ACTIVE | Noted: 2025-03-01

## 2025-03-01 PROBLEM — Z87.891 FORMER SMOKER: Status: ACTIVE | Noted: 2025-03-01

## 2025-03-01 ASSESSMENT — ENCOUNTER SYMPTOMS
NEUROLOGICAL NEGATIVE: 1
PSYCHIATRIC NEGATIVE: 1
MUSCULOSKELETAL NEGATIVE: 1
VOMITING: 0
CARDIOVASCULAR NEGATIVE: 1
ALLERGIC/IMMUNOLOGIC NEGATIVE: 1
CHILLS: 0
HEMATOLOGIC/LYMPHATIC NEGATIVE: 1
NAUSEA: 0
FEVER: 0
RESPIRATORY NEGATIVE: 1
HYPERTENSION: 1
ENDOCRINE NEGATIVE: 1
ABDOMINAL PAIN: 1

## 2025-03-02 NOTE — PROGRESS NOTES
"Subjective   Patient ID: Gertrudis Marie is a 65 y.o. female who presents for Follow-up, Hyperlipidemia, and Hypertension.      Hyperlipidemia    Hypertension      Patient is here for follow-up of hypertension hyperlipidemia has been taking meds as prescribed denies chest pain shortness of breath current allergies myalgias dizziness been eating healthy and trying to increase exercise  Pin left lower quad imporved.   Has quit smoking  Current Outpatient Medications on File Prior to Visit   Medication Sig Dispense Refill    MULTIVITAMIN ORAL Take by mouth.      trospium (Sanctura) 20 mg tablet Take 1 tablet (20 mg) by mouth every 12 hours.      [DISCONTINUED] atorvastatin (Lipitor) 10 mg tablet Take 1 tablet (10 mg) by mouth once daily. 100 tablet 3    [DISCONTINUED] enalapril (Vasotec) 5 mg tablet Take 1 tablet (5 mg) by mouth once daily. 90 tablet 1     No current facility-administered medications on file prior to visit.        Review of Systems   Constitutional:  Negative for chills and fever.   HENT: Negative.     Respiratory: Negative.     Cardiovascular: Negative.    Gastrointestinal:  Positive for abdominal pain. Negative for nausea and vomiting.   Endocrine: Negative.    Genitourinary: Negative.    Musculoskeletal: Negative.    Skin: Negative.  Negative for rash.   Allergic/Immunologic: Negative.    Neurological: Negative.    Hematological: Negative.    Psychiatric/Behavioral: Negative.     All other systems reviewed and are negative.      Objective   /80   Pulse 94   Ht 1.651 m (5' 5\")   Wt 76.2 kg (168 lb)   BMI 27.96 kg/m²   BSA: 1.87 meters squared  Growth percentiles: Facility age limit for growth %ezekiel is 20 years. Facility age limit for growth %ezekiel is 20 years.   No visits with results within 1 Week(s) from this visit.   Latest known visit with results is:   Lab on 01/21/2025   Component Date Value Ref Range Status    Glucose 01/21/2025 85  74 - 99 mg/dL Final    Sodium 01/21/2025 140  136 - " 145 mmol/L Final    Potassium 01/21/2025 4.5  3.5 - 5.3 mmol/L Final    Chloride 01/21/2025 102  98 - 107 mmol/L Final    Bicarbonate 01/21/2025 29  21 - 32 mmol/L Final    Anion Gap 01/21/2025 14  10 - 20 mmol/L Final    Urea Nitrogen 01/21/2025 14  6 - 23 mg/dL Final    Creatinine 01/21/2025 0.74  0.50 - 1.05 mg/dL Final    eGFR 01/21/2025 90  >60 mL/min/1.73m*2 Final    Calculations of estimated GFR are performed using the 2021 CKD-EPI Study Refit equation without the race variable for the IDMS-Traceable creatinine methods.  https://jasn.asnjournals.org/content/early/2021/09/22/ASN.9495040844    Calcium 01/21/2025 10.4  8.6 - 10.6 mg/dL Final    WBC 01/21/2025 6.5  4.4 - 11.3 x10*3/uL Final    nRBC 01/21/2025 0.0  0.0 - 0.0 /100 WBCs Final    RBC 01/21/2025 4.82  4.00 - 5.20 x10*6/uL Final    Hemoglobin 01/21/2025 14.1  12.0 - 16.0 g/dL Final    Hematocrit 01/21/2025 42.3  36.0 - 46.0 % Final    MCV 01/21/2025 88  80 - 100 fL Final    MCH 01/21/2025 29.3  26.0 - 34.0 pg Final    MCHC 01/21/2025 33.3  32.0 - 36.0 g/dL Final    RDW 01/21/2025 12.1  11.5 - 14.5 % Final    Platelets 01/21/2025 287  150 - 450 x10*3/uL Final      Physical Exam  Constitutional:       Appearance: Normal appearance.   HENT:      Right Ear: Tympanic membrane normal.      Left Ear: Tympanic membrane normal.      Nose: Nose normal.      Mouth/Throat:      Mouth: Mucous membranes are moist.   Eyes:      Pupils: Pupils are equal, round, and reactive to light.   Cardiovascular:      Rate and Rhythm: Normal rate and regular rhythm.   Pulmonary:      Effort: Pulmonary effort is normal.      Breath sounds: Normal breath sounds.   Abdominal:      General: Bowel sounds are normal.   Neurological:      General: No focal deficit present.      Mental Status: She is alert.   Psychiatric:         Mood and Affect: Mood normal.         Assessment/Plan   Problem List Items Addressed This Visit       Primary hypertension - Primary    Relevant Medications     enalapril (Vasotec) 5 mg tablet    Other Relevant Orders    Lipid Panel    CBC    TSH with reflex to Free T4 if abnormal    Hemoglobin A1C    Comprehensive Metabolic Panel    Familial hyperlipidemia    Relevant Medications    atorvastatin (Lipitor) 10 mg tablet    Other Relevant Orders    Lipid Panel    CBC    TSH with reflex to Free T4 if abnormal    Hemoglobin A1C    Comprehensive Metabolic Panel    Routine general medical examination at a health care facility    Relevant Orders    Lipid Panel    CBC    TSH with reflex to Free T4 if abnormal    Hemoglobin A1C    Comprehensive Metabolic Panel    Screening for diabetes mellitus    Relevant Orders    Lipid Panel    CBC    TSH with reflex to Free T4 if abnormal    Hemoglobin A1C    Comprehensive Metabolic Panel    Screening for thyroid disorder    Relevant Orders    Lipid Panel    CBC    TSH with reflex to Free T4 if abnormal    Hemoglobin A1C    Comprehensive Metabolic Panel    Screening for lipid disorders    Relevant Orders    Lipid Panel    CBC    TSH with reflex to Free T4 if abnormal    Hemoglobin A1C    Comprehensive Metabolic Panel    Former smoker    Relevant Orders    CT lung screening low dose    Lipid Panel    CBC    TSH with reflex to Free T4 if abnormal    Hemoglobin A1C    Comprehensive Metabolic Panel

## 2025-06-06 ENCOUNTER — HOSPITAL ENCOUNTER (OUTPATIENT)
Dept: RADIOLOGY | Facility: CLINIC | Age: 65
Discharge: HOME | End: 2025-06-06
Payer: COMMERCIAL

## 2025-06-06 DIAGNOSIS — Z87.891 FORMER SMOKER: ICD-10-CM

## 2025-06-06 PROCEDURE — 71271 CT THORAX LUNG CANCER SCR C-: CPT
